# Patient Record
Sex: MALE | Race: BLACK OR AFRICAN AMERICAN | Employment: UNEMPLOYED
[De-identification: names, ages, dates, MRNs, and addresses within clinical notes are randomized per-mention and may not be internally consistent; named-entity substitution may affect disease eponyms.]

---

## 2024-06-08 ENCOUNTER — HOSPITAL ENCOUNTER (EMERGENCY)
Facility: HOSPITAL | Age: 19
Discharge: HOME | End: 2024-06-08
Attending: EMERGENCY MEDICINE
Payer: MEDICAID

## 2024-06-08 VITALS
BODY MASS INDEX: 30.2 KG/M2 | RESPIRATION RATE: 16 BRPM | WEIGHT: 248 LBS | SYSTOLIC BLOOD PRESSURE: 141 MMHG | TEMPERATURE: 98 F | DIASTOLIC BLOOD PRESSURE: 87 MMHG | HEART RATE: 97 BPM | HEIGHT: 76 IN | OXYGEN SATURATION: 99 %

## 2024-06-08 DIAGNOSIS — F43.23 SITUATIONAL MIXED ANXIETY AND DEPRESSIVE DISORDER: Primary | ICD-10-CM

## 2024-06-08 DIAGNOSIS — R82.90 ABNORMAL FINDING ON URINALYSIS: ICD-10-CM

## 2024-06-08 DIAGNOSIS — R63.0 LOSS OF APPETITE: ICD-10-CM

## 2024-06-08 LAB
AMPHET UR QL SCN: NEGATIVE
ANION GAP SERPL CALC-SCNC: 10 MMOL/L (ref 3–18)
APPEARANCE UR: CLEAR
BACTERIA URNS QL MICRO: ABNORMAL /HPF
BARBITURATES UR QL SCN: NEGATIVE
BASOPHILS # BLD: 0.1 K/UL (ref 0–0.1)
BASOPHILS NFR BLD: 1 % (ref 0–2)
BENZODIAZ UR QL: NEGATIVE
BILIRUB UR QL: ABNORMAL
BUN SERPL-MCNC: 7 MG/DL (ref 7–18)
BUN/CREAT SERPL: 7 (ref 12–20)
CALCIUM SERPL-MCNC: 9.5 MG/DL (ref 8.5–10.1)
CANNABINOIDS UR QL SCN: NEGATIVE
CHLORIDE SERPL-SCNC: 104 MMOL/L (ref 100–111)
CO2 SERPL-SCNC: 25 MMOL/L (ref 21–32)
COCAINE UR QL SCN: NEGATIVE
COLOR UR: ABNORMAL
CREAT SERPL-MCNC: 1.02 MG/DL (ref 0.6–1.3)
DIFFERENTIAL METHOD BLD: ABNORMAL
EOSINOPHIL # BLD: 0.1 K/UL (ref 0–0.4)
EOSINOPHIL NFR BLD: 1 % (ref 0–5)
EPITH CASTS URNS QL MICRO: NEGATIVE /LPF (ref 0–5)
ERYTHROCYTE [DISTWIDTH] IN BLOOD BY AUTOMATED COUNT: 11.8 % (ref 11.6–14.5)
ETHANOL SERPL-MCNC: <3 MG/DL (ref 0–3)
GLUCOSE SERPL-MCNC: 69 MG/DL (ref 74–99)
GLUCOSE UR STRIP.AUTO-MCNC: NEGATIVE MG/DL
HCT VFR BLD AUTO: 44.6 % (ref 36–48)
HGB BLD-MCNC: 14.9 G/DL (ref 13–16)
HGB UR QL STRIP: NEGATIVE
IMM GRANULOCYTES # BLD AUTO: 0 K/UL (ref 0–0.04)
IMM GRANULOCYTES NFR BLD AUTO: 0 % (ref 0–0.5)
KETONES UR QL STRIP.AUTO: 40 MG/DL
LEUKOCYTE ESTERASE UR QL STRIP.AUTO: ABNORMAL
LYMPHOCYTES # BLD: 2.7 K/UL (ref 0.9–3.6)
LYMPHOCYTES NFR BLD: 41 % (ref 21–52)
Lab: NORMAL
MCH RBC QN AUTO: 31.4 PG (ref 24–34)
MCHC RBC AUTO-ENTMCNC: 33.4 G/DL (ref 31–37)
MCV RBC AUTO: 93.9 FL (ref 78–100)
METHADONE UR QL: NEGATIVE
MONOCYTES # BLD: 0.8 K/UL (ref 0.05–1.2)
MONOCYTES NFR BLD: 12 % (ref 3–10)
MUCOUS THREADS URNS QL MICRO: ABNORMAL /LPF
NEUTS SEG # BLD: 3 K/UL (ref 1.8–8)
NEUTS SEG NFR BLD: 45 % (ref 40–73)
NITRITE UR QL STRIP.AUTO: POSITIVE
NRBC # BLD: 0 K/UL (ref 0–0.01)
NRBC BLD-RTO: 0 PER 100 WBC
OPIATES UR QL: NEGATIVE
PCP UR QL: NEGATIVE
PH UR STRIP: 6 (ref 5–8)
PLATELET # BLD AUTO: 362 K/UL (ref 135–420)
PMV BLD AUTO: 11.8 FL (ref 9.2–11.8)
POTASSIUM SERPL-SCNC: 4.2 MMOL/L (ref 3.5–5.5)
PROT UR STRIP-MCNC: 100 MG/DL
RBC # BLD AUTO: 4.75 M/UL (ref 4.35–5.65)
RBC #/AREA URNS HPF: NEGATIVE /HPF (ref 0–5)
SODIUM SERPL-SCNC: 139 MMOL/L (ref 136–145)
SP GR UR REFRACTOMETRY: >1.03 (ref 1–1.03)
UROBILINOGEN UR QL STRIP.AUTO: 1 EU/DL (ref 0.2–1)
WBC # BLD AUTO: 6.5 K/UL (ref 4.6–13.2)
WBC URNS QL MICRO: ABNORMAL /HPF (ref 0–4)

## 2024-06-08 PROCEDURE — 80307 DRUG TEST PRSMV CHEM ANLYZR: CPT

## 2024-06-08 PROCEDURE — 87086 URINE CULTURE/COLONY COUNT: CPT

## 2024-06-08 PROCEDURE — 85025 COMPLETE CBC W/AUTO DIFF WBC: CPT

## 2024-06-08 PROCEDURE — 81001 URINALYSIS AUTO W/SCOPE: CPT

## 2024-06-08 PROCEDURE — 80048 BASIC METABOLIC PNL TOTAL CA: CPT

## 2024-06-08 PROCEDURE — 90791 PSYCH DIAGNOSTIC EVALUATION: CPT | Performed by: SOCIAL WORKER

## 2024-06-08 PROCEDURE — 99283 EMERGENCY DEPT VISIT LOW MDM: CPT

## 2024-06-08 PROCEDURE — 82077 ASSAY SPEC XCP UR&BREATH IA: CPT

## 2024-06-08 ASSESSMENT — PAIN - FUNCTIONAL ASSESSMENT
PAIN_FUNCTIONAL_ASSESSMENT: NONE - DENIES PAIN

## 2024-06-08 ASSESSMENT — LIFESTYLE VARIABLES
HOW MANY STANDARD DRINKS CONTAINING ALCOHOL DO YOU HAVE ON A TYPICAL DAY: PATIENT DOES NOT DRINK
HOW OFTEN DO YOU HAVE A DRINK CONTAINING ALCOHOL: NEVER

## 2024-06-08 NOTE — ED TRIAGE NOTES
Pt arrived via Triage ambulatory c/o depression. Mom has given him to get out of the house by July and pt has been anxious about it and not wanting to eat.

## 2024-06-08 NOTE — ED NOTES
Patient states he is having \"a lot of stress\". States he isn't feeling like himself, had an anxiety attack and couldn't calm down for like an hour. Patient also states that he has been having nightmares. He was told that his mother states he has to get out of her house by the end of June and he doesn't have any family around here and he is a full time student.

## 2024-06-08 NOTE — ED PROVIDER NOTES
Dr. Armenta taking for patient care.  Patient signed out to me pending crisis evaluation for resources.  Patient requesting to be discharged at this time.  Believe this to be appropriate.  Denies any SI, HI, or hallucinations.  Will discharge patient with strict return precautions and follow-up recommendations.  Patient verbalized understanding and is without further questions.     Bruce Armenta Jr., DO  06/08/24 0729

## 2024-06-08 NOTE — DISCHARGE INSTRUCTIONS
[email or web address]/    [facility]  [address]  [place] [address]  [phone]  Fax:  Website: [email or web address]    [facility]  [address]  [place], [place]  [phone]  Fax:  Website: [email or web address]://[email or web address]."[email or web address].[email or web address]/    [facility]  [address]  [place] [address]  [phone]  Fax: NONE  Website: [email or web address].[email or web address] [facility]/[email or web address]    [facility]  [address]  [address]  [place] [address]  [phone]  Fax: [phone]  Website: [email or web address]    [facility]  [address] [place] [address]  [phone]  Fax: [phone]  Website: [email or web address]    [facility]  [address]  [place] [address]  [phone]  Fax:  Website: [email or web address]    [facility]  [address]  [place] [address]  [phone]  Website: [email or web address]    [facility]  [address]  [place] [address]  [phone]  Fax: [phone]  Website: [email or web address]    [facility]  [address] [address]  [phone]  Fax: [phone]  Website: [email or web address]               [facility]  [facility] [place] [address]  Phone: [phone]  Fax: [phone]    [facility] offers two comprehensive outpatient programs to assist individuals struggling with mental illness, substance abuse, and dual diagnosis (mental illness and substance abuse).  In-person and virtual sessions are now offered for the Partial Hospitalization Program (PHP).   Intensive Outpatient  and Thursday, for 3 hours per session   Group sessions are from 4:30 pm - 7:30 pm   Program length is approximately 6 weeks    Our intensive programs allow patients to receive treatment while remaining connected to friends, family, and other support systems. Both programs are primarily of group therapy session and we integrate individual therapy throughout the course of treatment.    Our caring, knowledgeable, and skilled team members include:   Board Certified Psychiatrist   Licensed and licensed-eligible behavioral health therapists   Recreation therapist   Art therapist    [facility] accepts most insurance, including Medicare and Medicaid. To learn more about PHP and/or IOP, or schedule an appointment please call [phone] (M-F 9:00 am - 5:00 pm).  We look forward to helping you on your path to wellness.       NATIONAL NUMBERS     Samaritans 5-179-887-HOPE ([phone])     Samariteens 9-281-607-TEEN ([phone])     National Suicide Prevention Lifeline 2-232-057-TALK (8255)        LOCAL NUMBERS     [facility] Emergency Services:  [phone]     [facility] Emergency Services:  [phone]     [place] Crisis Hotline:  969-3588     [place] Emergency Services Hotline:  043-3531     [facility] Emergency Services:  [phone]     [facility] Services Board:  [phone]

## 2024-06-08 NOTE — VIRTUAL HEALTH
Dima Nichols  [id number]  2005     Social Work Behavioral Health Crisis Assessment    06/08/24    Chief Complaint: Social Stressors, Major Depressive Disorder    HPI: Patient is a 18 y.o. Black /  male who presents for depression. Patient presented to the ED on 06/08/24 from home.    Past Psychiatric History:  Previous Diagnoses/symptoms: Major Depressive Disorder  Previous suicide attempts/self-harm: Denies  Inpatient psychiatric hospitalizations: denies  Current outpatient psychiatric provider: Denies  Current therapist: States not in therapy  Previous psychiatric medication trials: No prior medication trials  Current psychiatric medications: No current psychiatric medications  Family Psychiatric History: Denies    Sleep Hours: 8    Sleep concerns: denies    Use of sleep medications: denies    Substance Abuse History:  Tobacco: Denies  Alcohol: Denies  Marijuana: Denies  Stimulant: Denies  Opiates: Denies  Benzodiazepine: Denies  Other illicit drug usage: Denies  History of substance/alcohol abuse treatment: Denies    Social History:  Education: H.S.  Living Situation/Interest: with family  Marital/Committed relationship and parenting hx: single  Occupation: Unemployed  Legal History/Hx of Violence: Denies  Spiritual History: Denies  Psychological trauma, neglect, or abuse: denies hx of trauma/abuse   Access to guns or other weapons: denies having access to firearms/dangerous weapons     Past Medical History:  Active Ambulatory Problems     Diagnosis Date Noted    No Active Ambulatory Problems     Resolved Ambulatory Problems     Diagnosis Date Noted    No Resolved Ambulatory Problems     No Additional Past Medical History     Allergies:  No Known Allergies   Medications:  No current facility-administered medications for this encounter.    Current Outpatient Medications:     amphetamine-dextroamphetamine (ADDERALL) 30 MG tablet, Take 1 tablet by mouth daily., Disp: , Rfl:     cloNIDine  (CATAPRES) 0.1 MG tablet, Take 1 tablet by mouth nightly at bedtime, Disp: , Rfl:     benzocaine-menthol (CEPACOL SORE THROAT) 15-3.6 MG lozenge, Take 1 lozenge by mouth 4 times daily, Disp: , Rfl:   Not in a hospital admission.  Prior to Admission medications    Medication Sig Start Date End Date Taking? Authorizing Provider   amphetamine-dextroamphetamine (ADDERALL) 30 MG tablet Take 1 tablet by mouth daily. 1/28/24   Teofilo Muñoz MD   cloNIDine (CATAPRES) 0.1 MG tablet Take 1 tablet by mouth nightly at bedtime 1/28/24   Teofilo Muñoz MD   benzocaine-menthol (CEPACOL SORE THROAT) 15-3.6 MG lozenge Take 1 lozenge by mouth 4 times daily 8/5/22   Teofilo Muñoz MD        Labs:  UDS: not available  ETOH: not available  HCG: Unknown      Mental Status Exam:  Level of consciousness:  within normal limits   Appearance:  well-appearing.  Does appear stated age. No acute distress.  Behavior/Motor:  no abnormalities noted  Attitude toward examiner:  cooperative  SI/HI:Denies SI/HI  Speech:  normal rate , Tone: normal tone  Mood: within normal limits  Affect: mood congruent  Thought Processes:  linear.   Thought Content: No delusions or other perceptual abnormalities  Hallucinations:  Hallucinations: Denies AVOT-H  Cognition:  oriented to person, place, and time   Concentration: intact  Memory: intact, though not formally tested.  Insight: good   Judgement: good   Fund of Knowledge: good    Overall Level Suicide Risk: TelePsych CSSRS Risk Level: Low Risk  CSSR-S Screening: Completed    Brief ClinicalSummary:   Patient is a 18 y.o.  male who presents for social stressors and depression. The patient lives with his mother, who is a . She has given the patient until the end of June to find his own place to live. The patient is dealing with the stress of finding a job and a new place, which is triggering depressive symptoms. The patient came to the hospital tonight because he

## 2024-06-08 NOTE — ED NOTES
Assumed care of pt after receiving bedside shift report from Aria DUVAL.     Pt here for depression and anxiety, awaiting BSMART consult. Pt currently up in room, awake and alert. NAD. Pt denies SI/HI.

## 2024-06-08 NOTE — ED PROVIDER NOTES
EMERGENCY DEPARTMENT HISTORY AND PHYSICAL EXAM    Date: 6/8/2024  Patient Name: Dima Nichols    History of Presenting Illness     Chief Complaint:   Chief Complaint   Patient presents with    Depression     History Provided By: Patient    Additional History (Context): Dima Nichols is a 18 y.o. male with history of ADHD presents for evaluation of depression and loss of appetite.  States, he had a few panic attacks at home and now his mother gave him eviction notice and he must move out by July.  Patient reports increased anxiety about the slightly, reports 40 pound weight loss due to anorexia secondary to anxiety and depression.  Denies pains but notes occasional generalized discomfort in abdomen due to lack of fluid intake per patient.  He also notes that he recently dropped out of college as he could not change his major in the middle of the semester.  He is currently unemployed.  Patient takes no medications at the moment.  He used to take Adderall and clonidine in the past.  Denies drug or alcohol use. Denies SI, HI, hallucinations.    PCP: Gena Aguilar, [place] 6/19/24 (per records)    No current facility-administered medications for this encounter.     Current Outpatient Medications   Medication Sig Dispense Refill    amphetamine-dextroamphetamine (ADDERALL) 30 MG tablet Take 1 tablet by mouth daily.      cloNIDine (CATAPRES) 0.1 MG tablet Take 1 tablet by mouth nightly at bedtime      benzocaine-menthol (CEPACOL SORE THROAT) 15-3.6 MG lozenge Take 1 lozenge by mouth 4 times daily         Past History     Past Medical History:  No past medical history on file.    Past Surgical History:  No past surgical history on file.    Family History:  No family history on file.    Social History:       Allergies:  No Known Allergies      Review of Systems   Review of Systems  All Other Systems Negative  10 point review of systems otherwise negative unless noted in HPI.     Physical Exam     Vitals:    06/08/24 0021 06/08/24  thoroughly.   Bedside rounding on patient occured : no  Intended disposition of patient : home  Pending diagnostics reports and/or labs (please list): BSMART consult, can be dc home once BSMART approves.     UA noted noticed to have nitrites, white BC on microscopic UA is 0-3, patient denies any urinary symptoms whatsoever, no STD concerns.  Unsure if UA results are accurate.  Urine culture ordered and results will be addressed, will not start on ABX just yet.    MED RECONCILIATION:  No current facility-administered medications for this encounter.     Current Outpatient Medications   Medication Sig    amphetamine-dextroamphetamine (ADDERALL) 30 MG tablet Take 1 tablet by mouth daily.    cloNIDine (CATAPRES) 0.1 MG tablet Take 1 tablet by mouth nightly at bedtime    benzocaine-menthol (CEPACOL SORE THROAT) 15-3.6 MG lozenge Take 1 lozenge by mouth 4 times daily       Disposition:  home (?),  Awaiting BSMART consult          Medication List        ASK your doctor about these medications      amphetamine-dextroamphetamine 30 MG tablet  Commonly known as: ADDERALL     benzocaine-menthol 15-3.6 MG lozenge  Commonly known as: CEPACOL SORE THROAT     cloNIDine 0.1 MG tablet  Commonly known as: CATAPRES                  Diagnosis     Clinical Impression:   1. Situational mixed anxiety and depressive disorder    2. Loss of appetite    3. Abnormal finding on urinalysis - no urinary symptoms or penile discharge          Dictation disclaimer:  Please note that this dictation was completed with [employer], the computer voice recognition software.  Quite often unanticipated grammatical, syntax, homophones, and other interpretive errors are inadvertently transcribed by the computer software.  Please disregard these errors.  Please excuse any errors that have escaped final proofreading.        Bill Serra PA  06/08/24 0304       Bill Serra PA  06/08/24 0307

## 2024-06-08 NOTE — FLOWSHEET NOTE
06/08/24 0746   Departure Condition   Departure Condition Stable   Mobility at Departure Ambulatory   Patient Teaching Discharge instructions reviewed;Follow-up care reviewed;Patient verbalized understanding   Discharged with Documented Belongings Yes   Valuables Given To Patient   Departure Mode With parents   Discharged with LDA No   Vital Signs   Temp 98 °F (36.7 °C)   Temp Source Oral   Pulse 97   Respirations 16   BP (!) 141/87   MAP (Calculated) 105   BP Location Left upper arm   Pain Assessment   Pain Assessment None - Denies Pain   Oxygen Therapy   SpO2 99 %

## 2024-06-09 LAB
BACTERIA SPEC CULT: NORMAL
SERVICE CMNT-IMP: NORMAL

## 2024-08-02 ENCOUNTER — HOSPITAL ENCOUNTER (EMERGENCY)
Facility: HOSPITAL | Age: 19
Discharge: HOME | End: 2024-08-02
Attending: EMERGENCY MEDICINE
Payer: MEDICAID

## 2024-08-02 VITALS
SYSTOLIC BLOOD PRESSURE: 144 MMHG | TEMPERATURE: 98.6 F | HEART RATE: 83 BPM | OXYGEN SATURATION: 96 % | HEIGHT: 76 IN | RESPIRATION RATE: 14 BRPM | BODY MASS INDEX: 26.79 KG/M2 | WEIGHT: 220 LBS | DIASTOLIC BLOOD PRESSURE: 83 MMHG

## 2024-08-02 DIAGNOSIS — F32.A DEPRESSION, UNSPECIFIED DEPRESSION TYPE: Primary | ICD-10-CM

## 2024-08-02 LAB
ALBUMIN SERPL-MCNC: 4.2 G/DL (ref 3.4–5)
ALBUMIN/GLOB SERPL: 1.2 (ref 0.8–1.7)
ALP SERPL-CCNC: 51 U/L (ref 45–117)
ALT SERPL-CCNC: 25 U/L (ref 16–61)
AMPHET UR QL SCN: NEGATIVE
ANION GAP SERPL CALC-SCNC: 7 MMOL/L (ref 3–18)
APAP SERPL-MCNC: <2 UG/ML (ref 10–30)
AST SERPL-CCNC: 21 U/L (ref 10–38)
BARBITURATES UR QL SCN: NEGATIVE
BASOPHILS # BLD: 0.1 K/UL (ref 0–0.1)
BASOPHILS NFR BLD: 2 % (ref 0–2)
BENZODIAZ UR QL: NEGATIVE
BILIRUB SERPL-MCNC: 1 MG/DL (ref 0.2–1)
BUN SERPL-MCNC: 6 MG/DL (ref 7–18)
BUN/CREAT SERPL: 7 (ref 12–20)
CALCIUM SERPL-MCNC: 9.8 MG/DL (ref 8.5–10.1)
CANNABINOIDS UR QL SCN: NEGATIVE
CHLORIDE SERPL-SCNC: 106 MMOL/L (ref 100–111)
CO2 SERPL-SCNC: 27 MMOL/L (ref 21–32)
COCAINE UR QL SCN: NEGATIVE
CREAT SERPL-MCNC: 0.88 MG/DL (ref 0.6–1.3)
DIFFERENTIAL METHOD BLD: ABNORMAL
EOSINOPHIL # BLD: 0 K/UL (ref 0–0.4)
EOSINOPHIL NFR BLD: 1 % (ref 0–5)
ERYTHROCYTE [DISTWIDTH] IN BLOOD BY AUTOMATED COUNT: 12.2 % (ref 11.6–14.5)
ETHANOL SERPL-MCNC: <3 MG/DL (ref 0–3)
GLOBULIN SER CALC-MCNC: 3.4 G/DL (ref 2–4)
GLUCOSE SERPL-MCNC: 79 MG/DL (ref 74–99)
HCT VFR BLD AUTO: 44.4 % (ref 36–48)
HGB BLD-MCNC: 14.8 G/DL (ref 13–16)
IMM GRANULOCYTES # BLD AUTO: 0 K/UL (ref 0–0.04)
IMM GRANULOCYTES NFR BLD AUTO: 0 % (ref 0–0.5)
LYMPHOCYTES # BLD: 1.6 K/UL (ref 0.9–3.6)
LYMPHOCYTES NFR BLD: 37 % (ref 21–52)
Lab: NORMAL
MCH RBC QN AUTO: 31.5 PG (ref 24–34)
MCHC RBC AUTO-ENTMCNC: 33.3 G/DL (ref 31–37)
MCV RBC AUTO: 94.5 FL (ref 78–100)
METHADONE UR QL: NEGATIVE
MONOCYTES # BLD: 0.6 K/UL (ref 0.05–1.2)
MONOCYTES NFR BLD: 13 % (ref 3–10)
NEUTS SEG # BLD: 2.1 K/UL (ref 1.8–8)
NEUTS SEG NFR BLD: 47 % (ref 40–73)
NRBC # BLD: 0 K/UL (ref 0–0.01)
NRBC BLD-RTO: 0 PER 100 WBC
OPIATES UR QL: NEGATIVE
PCP UR QL: NEGATIVE
PLATELET # BLD AUTO: ABNORMAL K/UL (ref 135–420)
PMV BLD AUTO: 12 FL (ref 9.2–11.8)
POTASSIUM SERPL-SCNC: 4 MMOL/L (ref 3.5–5.5)
PROT SERPL-MCNC: 7.6 G/DL (ref 6.4–8.2)
RBC # BLD AUTO: 4.7 M/UL (ref 4.35–5.65)
SALICYLATES SERPL-MCNC: <1.7 MG/DL (ref 2.8–20)
SODIUM SERPL-SCNC: 140 MMOL/L (ref 136–145)
WBC # BLD AUTO: 4.4 K/UL (ref 4.6–13.2)

## 2024-08-02 PROCEDURE — 80307 DRUG TEST PRSMV CHEM ANLYZR: CPT

## 2024-08-02 PROCEDURE — 82077 ASSAY SPEC XCP UR&BREATH IA: CPT

## 2024-08-02 PROCEDURE — 99285 EMERGENCY DEPT VISIT HI MDM: CPT

## 2024-08-02 PROCEDURE — 80053 COMPREHEN METABOLIC PANEL: CPT

## 2024-08-02 PROCEDURE — 80179 DRUG ASSAY SALICYLATE: CPT

## 2024-08-02 PROCEDURE — 85025 COMPLETE CBC W/AUTO DIFF WBC: CPT

## 2024-08-02 PROCEDURE — 80143 DRUG ASSAY ACETAMINOPHEN: CPT

## 2024-08-02 ASSESSMENT — PAIN - FUNCTIONAL ASSESSMENT: PAIN_FUNCTIONAL_ASSESSMENT: NONE - DENIES PAIN

## 2024-08-02 NOTE — DISCHARGE INSTRUCTIONS
Call 911 immediately for any thoughts of harming yourself or other or hallucinations.   Return to the ER for any new, worsening, or persistent symptoms including thoughts of harming self or others, confusion, hallucinations, or any concerns.  Follow-up with the provider.     PPD skin test reading was negative 0mm.  Jose Miguel RN BSN

## 2024-08-02 NOTE — ED TRIAGE NOTES
Patient arrived via triage for c/o mental health problem. Patient states that he has been feeling depressed and some anxiety from traumatic events recently.
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
## 2024-08-02 NOTE — ED PROVIDER NOTES
EMERGENCY DEPARTMENT HISTORY AND PHYSICAL EXAM    1:18 PM      Date: 8/2/2024  Patient Name: Dima Nichols    History of Presenting Illness     Chief Complaint   Patient presents with    Mental Health Problem       History From: Patient  HPI  Dima Nichols is a 18 y.o. who male  has no past medical history on file.. Dima Nichols is a 18 y.o. male presenting with depression and intermittent suicidal thoughts.  He states he does not feel like he would act on his suicidal thoughts but notes that he is not in a good place mentally.  He states he is here for similar symptoms and was not suicidal so was discharged home and has been trying to use hotlines to arrange for a therapist referral but has not been successful in doing so.  He states that he has been trying to do the right thing and to try to join the  but they said he had to be off his antidepressants for 6 months so he has stopped them he denies homicidal ideations or hallucinations.       Nursing Notes were all reviewed and agreed with or any disagreements were addressed in the HPI.    PCP: No primary care provider on file.    No current facility-administered medications for this encounter.     Current Outpatient Medications   Medication Sig Dispense Refill    amphetamine-dextroamphetamine (ADDERALL) 30 MG tablet Take 1 tablet by mouth daily.      cloNIDine (CATAPRES) 0.1 MG tablet Take 1 tablet by mouth nightly at bedtime      benzocaine-menthol (CEPACOL SORE THROAT) 15-3.6 MG lozenge Take 1 lozenge by mouth 4 times daily         Past History     Past Medical History:  History reviewed. No pertinent past medical history.    Past Surgical History:  History reviewed. No pertinent surgical history.    Family History:  History reviewed. No pertinent family history.    Social History:       Allergies:  No Known Allergies          Focused Physical Exam   BP (!) 144/83   Pulse 83   Temp 98.6 °F (37 °C) (Oral)   Resp 14   Ht 1.93 m (6' 4\")   Wt 99.8 kg (220

## 2024-08-02 NOTE — VIRTUAL HEALTH
[place] Consult to Tele-Psych  Consult performed by: Minerva Ryder, APRN - CNP  Consult ordered by: Emma Salas DO  Reason for consult: Other: Per bsmart patient has a therapist that he has not been to in 2 years, not really receptive to inpatient treatment.      Reason for Cancel: Other    This writer discussed case with ED provider, Dr. Salas, who reports will defer to on-call psychiatrist recommendation from Dr. Parr and plan for discharge. Per Dr. Salas, will consult CM and BSMART for discharge planning, MH resources, and assistance obtaining outpatient MH appointments with patient's therapist.     Per BSMART note 08/02: “Crisis spoke with the patient about telepsych recommending inpatient psychiatric admission, as he stated, \"I do have thoughts; however, I do not have the mental capacity to actually go through with it. I also have a therapist, but I haven't seen him in 2 years.\" Crisis suggested doing a reassessment since patient informed that he does have therapist; however, if telepsych, continues to recommends inpatient treatment and patient is still not receptive to treatment a TDO petition will be conducted and crisis informed him on the steps of a TDO. In consultation with on call provider Keshav MCDANIEL, regarding inpatient psychiatric admission and informed that the patient could be discharged home with outpatient resources.”

## 2024-08-02 NOTE — BSMART NOTE
Chief Complaint   Patient presents with    Mental Health Problem       Patient was evaluated by Tele-Psych who recommends inpatient psychiatric treatment for suicidal ideations without a plan     Patient is not voluntary for inpatient treatment.    History reviewed. No pertinent past medical history.    Prior to Visit Medications    Medication Sig Taking? Authorizing Provider   amphetamine-dextroamphetamine (ADDERALL) 30 MG tablet Take 1 tablet by mouth daily.  Teofilo Muñoz MD   cloNIDine (CATAPRES) 0.1 MG tablet Take 1 tablet by mouth nightly at bedtime  ProviderTeofilo MD   benzocaine-menthol (CEPACOL SORE THROAT) 15-3.6 MG lozenge Take 1 lozenge by mouth 4 times daily  ProviderTeofilo MD         The following labs and vitals were reviewed with on-call psychiatrist.    BMP, CMP, and CBC    Vitals:    08/02/24 0813   BP: (!) 144/83   Pulse: 83   Resp: 14   Temp: 98.6 °F (37 °C)   SpO2: 96%         Writer met with patient to assess the following    Ambulation - Patient reports ability to ambulate without difficulty or the use of any assistive devices. and Patient denies any recent falls in the past three months .     ADLs - Patient able to perform own ADLs without assistance.    DME - Patient requires none.    Crisis spoke with the patient about telepsych recommending inpatient psychiatric admission, as he stated, \"I do have thoughts; however, I do not have the mental capacity to actually go through with it. I also have a therapist, but I haven't seen him in 2 years.\" Crisis suggested doing a reassessment since patient informed that he does have therapist; however, if telepsych, continues to recommends inpatient treatment and patient is still not receptive to treatment a TDO petition will be conducted and crisis informed him on the steps of a TDO.     In consultation with on call provider Keshav MCDANIEL, regarding inpatient psychiatric admission and informed that the patient could be discharged

## 2024-09-23 ENCOUNTER — APPOINTMENT (OUTPATIENT)
Facility: HOSPITAL | Age: 19
End: 2024-09-23
Payer: MEDICAID

## 2024-09-23 ENCOUNTER — TELEPHONE (OUTPATIENT)
Facility: HOSPITAL | Age: 19
End: 2024-09-23

## 2024-09-26 ENCOUNTER — HOSPITAL ENCOUNTER (OUTPATIENT)
Facility: HOSPITAL | Age: 19
Setting detail: RECURRING SERIES
Discharge: HOME | End: 2024-09-29
Payer: MEDICAID

## 2024-09-26 PROCEDURE — 97162 PT EVAL MOD COMPLEX 30 MIN: CPT

## 2024-09-26 NOTE — THERAPY EVALUATION
[facility]  [address], [place] [address]  Ph:[phone] Fx:[phone]  Plan of Care / Statement of Necessity for Physical Therapy Services     Patient Name: Dima Nichols : 2005   Medical   Diagnosis: Pain in left hip [M25.552]  Pain in right hip [M25.551]  Pain in right hand [M79.641]  Pain in left hand [M79.642]  Pain in left shoulder [M25.512]  Pain in right shoulder [M25.511]  Treatment Diagnosis:  M25.551  RIGHT HIP PAIN and M25.552  LEFT HIP PAIN  and M25.511  RIGHT SHOULDER PAIN, M25.512  LEFT SHOULDER PAIN, M79.641  Pain in right hand, and M79.642  Pain in left hand   Onset Date: 24     Referral Source: Shannan Olmstead RN Start of Care (SOC): 2024   Prior Hospitalization: See medical history Provider #: [id number]   Prior Level of Function: Independent with ADLS , walking and community activities  with no pain    Comorbidities:  Psychological disorders , ADD, Depression, Autism,Anxiety     Assessment / key information:  Dima Nichols is a 18 y.o.   male with Dx:  Bilateral Hip joint pain, pain in B shoulders and pain in B hands who reports  B  hip pain/ weakness/ tightness  , pain in B groin area, B shoulder pain / weakness/ tightness, pain in B scapular areas, B hand pain/ weakness .  Pt rates pain as 7/10 max, 3/10 at best, 5/10 today, increasing with   ADLS , walking and community activities .  Pt reports he has cramps, pain and tingling in B hands . Pt  also reports that he has weak  of both hands.Objective:Lower Extremity Functional Scale (LEFS) = 60 ; (40-60 Mild to Moderate Dysfunction) = MODERATE Complexity and QuickDASH: Disability Arm, Shoulder, Hand = 9.09 % ; (0% - 40% Normal to Mild Disability) = LOW ComplexityAROM:  L hip Flexion 102 degree ( Pain is limiting movement )  , AROM  R hip Flexion 105 degree ( Pain is limiting movement ) .  B Hip pain increases with all movements of B hip pain.  MMT: B

## 2024-09-26 NOTE — PROGRESS NOTES
PHYSICAL / OCCUPATIONAL THERAPY - DAILY TREATMENT NOTE (updated )  For Eval visit    Patient Name: Dima Nichols    Date: 2024    : 2005  Insurance: Payor: [facility] MEDICAID / Plan: [facility] CARDINAL CARE / Product Type: *No Product type* /      Patient  verified yes     Visit #   Current / Total 1 16   Time   In / Out 932 [id number]   Pain   In / Out 5 5   Subjective Functional Status/Changes: See POC     TREATMENT AREA =  see POC    OBJECTIVE          43 min   Eval - untimed                      Therapeutic Procedures:    Tx Min Billable or 1:1 Min (if diff from Tx Min) Procedure, Rationale, Specifics          Details if applicable:              Details if applicable:            Details if applicable:            Details if applicable:       [facility] Totals Reminder: bill using total billable min of TIMED therapeutic procedures (example: do not include dry needle or estim unattended, both untimed codes, in totals to left)  8-22 min = 1 unit; 23-37 min = 2 units; 38-52 min = 3 units; 53-67 min = 4 units; 68-82 min = 5 units   Total Total     [x]  Patient Education billed concurrently with other procedures   [x] Review HEP    [] Progressed/Changed HEP, detail:    [] Other detail:       Objective Information/Functional Measures/Assessment    See POC    Patient will continue to benefit from skilled PT / OT services to modify and progress therapeutic interventions, analyze and address functional mobility deficits, analyze and address ROM deficits, analyze and address strength deficits, analyze and address soft tissue restrictions, analyze and cue for proper movement patterns, and analyze and modify for postural abnormalities to address functional deficits and attain remaining goals.    Progress toward goals / Updated goals:  [x]  See POC        PLAN  yes Continue plan of care  []  Other:      Dali Wills, PT    2024    4:44 PM  If an interpreting service was utilized for treatment of this

## 2024-10-02 ENCOUNTER — TELEPHONE (OUTPATIENT)
Facility: HOSPITAL | Age: 19
End: 2024-10-02

## 2024-10-02 NOTE — TELEPHONE ENCOUNTER
ERICK on 10/2/24 at 2:51pm to schedule f/u visits due to his auth being approved for 16 visits expiring on 11/22/24. 1-2 times a week.

## 2024-10-14 ENCOUNTER — HOSPITAL ENCOUNTER (OUTPATIENT)
Facility: HOSPITAL | Age: 19
Setting detail: RECURRING SERIES
Discharge: HOME | End: 2024-10-17
Payer: MEDICAID

## 2024-10-14 PROCEDURE — 97112 NEUROMUSCULAR REEDUCATION: CPT

## 2024-10-14 PROCEDURE — 97530 THERAPEUTIC ACTIVITIES: CPT

## 2024-10-14 PROCEDURE — 97535 SELF CARE MNGMENT TRAINING: CPT

## 2024-10-14 PROCEDURE — 97110 THERAPEUTIC EXERCISES: CPT

## 2024-10-14 NOTE — PROGRESS NOTES
PHYSICAL / OCCUPATIONAL THERAPY - DAILY TREATMENT NOTE    Patient Name: Dima Nichols    Date: 10/14/2024    : 2005  Insurance: Payor: [facility] MEDICAID / Plan: [facility] PLAN CARDINAL CARE / Product Type: *No Product type* /      Patient  verified Yes     Visit #   Current / Total 2 16   Time   In / Out 9:30 10:10   Pain   In / Out 3 0   Subjective Functional Status/Changes: Pt reports that he has been doing his exercises at home and having less pain.      TREATMENT AREA =  Pain in left hip [M25.552]  Pain in right hip [M25.551]  Pain in right hand [M79.641]  Pain in left hand [M79.642]  Pain in left shoulder [M25.512]  Pain in right shoulder [M25.511]     OBJECTIVE    Therapeutic Procedures:    Tx Min Billable or 1:1 Min (if diff from Tx Min) Procedure, Rationale, Specifics   14  36183 Therapeutic Activity (timed):  use of dynamic activities replicating functional movements to increase ROM, strength, coordination, balance, and proprioception in order to improve patient's ability to progress to PLOF and address remaining functional goals.  (see flow sheet as applicable)     Details if applicable:       10  14142 Therapeutic Exercise (timed):  increase ROM, strength, coordination, balance, and proprioception to improve patient's ability to progress to PLOF and address remaining functional goals. (see flow sheet as applicable)     Details if applicable:     8  36383 Neuromuscular Re-Education (timed):  improve balance, coordination, kinesthetic sense, posture, core stability and proprioception to improve patient's ability to develop conscious control of individual muscles and awareness of position of extremities in order to progress to PLOF and address remaining functional goals. (see flow sheet as applicable)     Details if applicable:     8  41921 Self Care/Home Management (timed):  improve patient knowledge and understanding of pain reducing techniques, activity modification, diagnosis/prognosis,

## 2024-10-18 ENCOUNTER — HOSPITAL ENCOUNTER (OUTPATIENT)
Facility: HOSPITAL | Age: 19
Setting detail: RECURRING SERIES
Discharge: HOME | End: 2024-10-21
Payer: MEDICAID

## 2024-10-18 PROCEDURE — 97110 THERAPEUTIC EXERCISES: CPT

## 2024-10-18 PROCEDURE — 97112 NEUROMUSCULAR REEDUCATION: CPT

## 2024-10-18 PROCEDURE — 97535 SELF CARE MNGMENT TRAINING: CPT

## 2024-10-18 PROCEDURE — 97530 THERAPEUTIC ACTIVITIES: CPT

## 2024-10-18 NOTE — PROGRESS NOTES
PHYSICAL / OCCUPATIONAL THERAPY - DAILY TREATMENT NOTE    Patient Name: Dima Nichols    Date: 10/18/2024    : 2005  Insurance: Payor: [facility] MEDICAID / Plan: [facility] PLAN CARDINAL CARE / Product Type: *No Product type* /      Patient  verified Yes     Visit #   Current / Total 3 16   Time   In / Out 9:34 10:19   Pain   In / Out 0 0   Subjective Functional Status/Changes: \"I had a little pain on Monday.\"     TREATMENT AREA =  Pain in left hip [M25.552]  Pain in right hip [M25.551]  Pain in right hand [M79.641]  Pain in left hand [M79.642]  Pain in left shoulder [M25.512]  Pain in right shoulder [M25.511]     OBJECTIVE         Therapeutic Procedures:    Tx Min Billable or 1:1 Min (if diff from Tx Min) Procedure, Rationale, Specifics   15  80799 Therapeutic Exercise (timed):  increase ROM, strength, coordination, balance, and proprioception to improve patient's ability to progress to PLOF and address remaining functional goals. (see flow sheet as applicable)     Details if applicable:  increased  rep  per flow sheet     12  43101 Therapeutic Activity (timed):  use of dynamic activities replicating functional movements to increase ROM, strength, coordination, balance, and proprioception in order to improve patient's ability to progress to PLOF and address remaining functional goals.  (see flow sheet as applicable)     Details if applicable:     10  79937 Neuromuscular Re-Education (timed):  improve balance, coordination, kinesthetic sense, posture, core stability and proprioception to improve patient's ability to develop conscious control of individual muscles and awareness of position of extremities in order to progress to PLOF and address remaining functional goals. (see flow sheet as applicable)     Details if applicable:     8  56328 Self Care/Home Management (timed):  improve patient knowledge and understanding of reviewed  HEP  to improve patient's ability to progress to PLOF and address

## 2024-10-21 ENCOUNTER — HOSPITAL ENCOUNTER (OUTPATIENT)
Facility: HOSPITAL | Age: 19
Setting detail: RECURRING SERIES
End: 2024-10-21
Payer: MEDICAID

## 2024-10-21 ENCOUNTER — TELEPHONE (OUTPATIENT)
Facility: HOSPITAL | Age: 19
End: 2024-10-21

## 2024-10-21 NOTE — TELEPHONE ENCOUNTER
Patient cancelled appt. on 10/21/24 at 8:56am due to the patient having another appt. at that time and he is not able to make his appt. today.

## 2024-10-21 NOTE — TELEPHONE ENCOUNTER
Patient cancelled appt. on 10/21/24 at 8:56am due to the patient having another appt. at that time and he is not able to make his appt. today, R/S TO 10/23/24.

## 2024-10-25 ENCOUNTER — APPOINTMENT (OUTPATIENT)
Facility: HOSPITAL | Age: 19
End: 2024-10-25
Payer: MEDICAID

## 2024-10-28 ENCOUNTER — APPOINTMENT (OUTPATIENT)
Facility: HOSPITAL | Age: 19
End: 2024-10-28
Payer: MEDICAID

## 2024-10-29 ENCOUNTER — HOSPITAL ENCOUNTER (OUTPATIENT)
Facility: HOSPITAL | Age: 19
Setting detail: RECURRING SERIES
Discharge: HOME | End: 2024-11-01
Payer: MEDICAID

## 2024-10-29 PROCEDURE — 97110 THERAPEUTIC EXERCISES: CPT

## 2024-10-29 PROCEDURE — 97530 THERAPEUTIC ACTIVITIES: CPT

## 2024-10-29 PROCEDURE — 97112 NEUROMUSCULAR REEDUCATION: CPT

## 2024-10-29 NOTE — THERAPY RECERTIFICATION
[facility]  [address], [address], [place] [address]  Ph:[phone] Fx:[phone]  PHYSICAL THERAPY PROGRESS NOTE  Patient Name: Dima Nichols : 2005   Treatment/Medical Diagnosis: Pain in left hip [M25.552]  Pain in right hip [M25.551]  Pain in right hand [M79.641]  Pain in left hand [M79.642]  Pain in left shoulder [M25.512]  Pain in right shoulder [M25.511]   Referral Source: Shannan Olmstead RN     Date of Initial Visit: 24 Attended Visits: 4 Missed Visits: 2     SUMMARY OF TREATMENT  Patient has been seen for 4 PT sessions and has shown good progress with PT.  Pt reports 75% improvement. Pain level on average is 4/10.  Pt reports less pain and  more mobility of  (B) shoulders,and B hips,able to perform daily chores and job activity. Pt's limitation is lifting heavy objects.      CURRENT STATUS  1. Independent with HEP.  EVAL: HEP not established and given to patient   Current:  1 x day   met     2. Decrease pain assist  with  ADLS  and  community activities   EVAL: 5/10   Current:  4/10   progressing     Long Term Goals: To be accomplished in  6-8  weeks:  1. Decrease pain  to  to assist with  ADLS  and community activities   EVAL: 5/10    Current: 4/10  progressing      2. Improve Lower Extremity Functional Scale (LEFS)  to 80 points in order to show significant functional improvement.  EVAL: Lower Extremity Functional Scale (LEFS)  Score =  60  Current: not assessed      3.Improve QuickDASH: Disability Arm, Shoulder, Hand to 0% in order to show significant functional improvement.  EVAL: QuickDASH: Disability Arm, Shoulder, Hand = 9.09 %    Current :25  not met     3.  To improve B shoulder  strength  ( B shoulder Flex / Abd ) to 5/5 in order to assist with ADLS with decrease Pain   EVAL: 3+/5  Current:   (B) Flex/Abduction  5  met     .4. To improve B knee Flex / Ext /B hip Flex strength to 5/5 in order to assist with ADLS and

## 2024-10-29 NOTE — PROGRESS NOTES
estim unattended, both untimed codes, in totals to left)  8-22 min = 1 unit; 23-37 min = 2 units; 38-52 min = 3 units; 53-67 min = 4 units; 68-82 min = 5 units   Total Total     [x]  Patient Education billed concurrently with other procedures   [x] Review HEP    [] Progressed/Changed HEP, detail:    [] Other detail:       Objective Information/Functional Measures/Assessment  DASH 25  LEFS  complete NV  Functional Gains:  pt reports less pain and  more mobility at right hip, (B) shoulders,and hips, able to perform daily chores and job activity  Functional Deficits: heavy lifting  % improvement: 75%  Pain   Average: 4/10       Best: 2/10     Worst: 4/10  Patient Goal: \"get back to where I was before\"   Mr. Nichols has been seen for 4 treatments and has shown good progress with PT.  Pt reports 75% improvement. Pain level on average is 4/10.  Pt pt reports less pain and  more mobility at right hip, (B) shoulders,and hips, able to perform daily chores and job activity. Pt's limitation is lifting heavy objects.  DASH has increased from  9% to 25% which pt reports he is better. Recommend continuation of PT for pain reduction,further strengthening, improve pt's functional mobility.      Patient will continue to benefit from skilled PT / OT services to modify and progress therapeutic interventions, analyze and address functional mobility deficits, analyze and address ROM deficits, analyze and address strength deficits, analyze and address soft tissue restrictions, analyze and cue for proper movement patterns, analyze and modify for postural abnormalities, analyze and address imbalance/dizziness, and instruct in home and community integration to address functional deficits and attain remaining goals.    Progress toward goals / Updated goals:  []  See Progress Note/Recertification  1. Independent with HEP.  EVAL: HEP not established and given to patient   Current:  1 x day   met     2. Decrease pain assist  with  ADLS  and

## 2025-04-02 ASSESSMENT — ENCOUNTER SYMPTOMS
CONSTIPATION: 0
BLOOD IN STOOL: 0
COUGH: 0
CHEST TIGHTNESS: 0
WHEEZING: 0
RHINORRHEA: 0
VOMITING: 0
SHORTNESS OF BREATH: 0
NAUSEA: 0
BACK PAIN: 0
DIARRHEA: 0
ABDOMINAL PAIN: 0
EYE PAIN: 0
SORE THROAT: 0

## 2025-04-02 NOTE — PROGRESS NOTES
Dima Nichols is a 19 y.o. male presenting today for New Patient (Pt is here to establish care. Pt would like to restart medications. Pt would like to have an EKG. Pt has experienced headaches and sore throat due to seasonal allergies.)  .     Chief Complaint   Patient presents with    New Patient     Pt is here to establish care. Pt would like to restart medications. Pt would like to have an EKG. Pt has experienced headaches and sore throat due to seasonal allergies.       HPI:  Dima Nichols presents to the office today for establishment of care    Patient has a PMH of ADHD, autism, seasonal allergies, depression, ALICJA, obesity    Depression: was previously on paroxetine, not on it any more. Goes to therapy 3x a week.  Patient states he would discuss starting medication when he sees them tomorrow.  He denies any plans to harm himself.  Feels like ever since he has been off his Adderall medication he has been feeling very unmotivated and down.  Patient lives with his mom and reports that she covers over him which she does not like.    ADHD: has been off adderall since June last year.  Not able to get any refills.     ALICJA: had sleep study as a child, was told he had a seizure while sleeping.  Patient has not had any seizures since then.  Reports daytime sleepiness, insomnia and waking up at 3 AM with inability to fall back asleep.     Obesity: Patient weighs 299 pounds reports he eats a lot of processed foods and has not been exercising as much.  He does have weights at home which he can use reports that he will start walking more to lose weight.    Today patient reports headaches and sore throat.  He denies any sick contacts and does not think he has a flu.  Suspects allergies to pollen is causing throat irritation.  He reports left eye vision changes with haziness around objects.  He used to wear glasses in the past and would like to start wearing it again.      Review of Systems   Constitutional:

## 2025-04-03 ENCOUNTER — OFFICE VISIT (OUTPATIENT)
Facility: CLINIC | Age: 20
End: 2025-04-03
Payer: MEDICAID

## 2025-04-03 VITALS
OXYGEN SATURATION: 98 % | DIASTOLIC BLOOD PRESSURE: 75 MMHG | HEIGHT: 76 IN | TEMPERATURE: 98.2 F | BODY MASS INDEX: 36.41 KG/M2 | WEIGHT: 299 LBS | RESPIRATION RATE: 18 BRPM | HEART RATE: 76 BPM | SYSTOLIC BLOOD PRESSURE: 139 MMHG

## 2025-04-03 DIAGNOSIS — Z11.4 SCREENING FOR HIV WITHOUT PRESENCE OF RISK FACTORS: ICD-10-CM

## 2025-04-03 DIAGNOSIS — E66.09 CLASS 2 OBESITY DUE TO EXCESS CALORIES WITHOUT SERIOUS COMORBIDITY WITH BODY MASS INDEX (BMI) OF 36.0 TO 36.9 IN ADULT: Primary | ICD-10-CM

## 2025-04-03 DIAGNOSIS — E66.812 CLASS 2 OBESITY DUE TO EXCESS CALORIES WITHOUT SERIOUS COMORBIDITY WITH BODY MASS INDEX (BMI) OF 36.0 TO 36.9 IN ADULT: ICD-10-CM

## 2025-04-03 DIAGNOSIS — F90.0 ATTENTION DEFICIT HYPERACTIVITY DISORDER (ADHD), PREDOMINANTLY INATTENTIVE TYPE: ICD-10-CM

## 2025-04-03 DIAGNOSIS — G47.33 OSA (OBSTRUCTIVE SLEEP APNEA): ICD-10-CM

## 2025-04-03 DIAGNOSIS — Z11.59 NEED FOR HEPATITIS C SCREENING TEST: ICD-10-CM

## 2025-04-03 DIAGNOSIS — Z76.89 ENCOUNTER TO ESTABLISH CARE WITH NEW DOCTOR: ICD-10-CM

## 2025-04-03 DIAGNOSIS — E66.812 CLASS 2 OBESITY DUE TO EXCESS CALORIES WITHOUT SERIOUS COMORBIDITY WITH BODY MASS INDEX (BMI) OF 36.0 TO 36.9 IN ADULT: Primary | ICD-10-CM

## 2025-04-03 DIAGNOSIS — J30.2 SEASONAL ALLERGIES: ICD-10-CM

## 2025-04-03 DIAGNOSIS — E66.09 CLASS 2 OBESITY DUE TO EXCESS CALORIES WITHOUT SERIOUS COMORBIDITY WITH BODY MASS INDEX (BMI) OF 36.0 TO 36.9 IN ADULT: ICD-10-CM

## 2025-04-03 DIAGNOSIS — Z13.31 POSITIVE DEPRESSION SCREENING: ICD-10-CM

## 2025-04-03 PROCEDURE — G2211 COMPLEX E/M VISIT ADD ON: HCPCS | Performed by: STUDENT IN AN ORGANIZED HEALTH CARE EDUCATION/TRAINING PROGRAM

## 2025-04-03 PROCEDURE — G0447 BEHAVIOR COUNSEL OBESITY 15M: HCPCS | Performed by: STUDENT IN AN ORGANIZED HEALTH CARE EDUCATION/TRAINING PROGRAM

## 2025-04-03 PROCEDURE — 99204 OFFICE O/P NEW MOD 45 MIN: CPT | Performed by: STUDENT IN AN ORGANIZED HEALTH CARE EDUCATION/TRAINING PROGRAM

## 2025-04-03 RX ORDER — CETIRIZINE HYDROCHLORIDE 10 MG/1
10 TABLET ORAL DAILY
Qty: 90 TABLET | Refills: 1 | Status: SHIPPED | OUTPATIENT
Start: 2025-04-03

## 2025-04-03 RX ORDER — FLUTICASONE PROPIONATE 50 MCG
1 SPRAY, SUSPENSION (ML) NASAL DAILY
Qty: 16 G | Refills: 1 | Status: SHIPPED | OUTPATIENT
Start: 2025-04-03

## 2025-04-03 RX ORDER — DEXTROAMPHETAMINE SACCHARATE, AMPHETAMINE ASPARTATE, DEXTROAMPHETAMINE SULFATE AND AMPHETAMINE SULFATE 7.5; 7.5; 7.5; 7.5 MG/1; MG/1; MG/1; MG/1
1 TABLET ORAL DAILY
Qty: 60 TABLET | Refills: 0 | Status: SHIPPED | OUTPATIENT
Start: 2025-04-03 | End: 2025-06-02

## 2025-04-03 RX ORDER — FLUTICASONE PROPIONATE 50 MCG
1 SPRAY, SUSPENSION (ML) NASAL DAILY
Qty: 16 G | Refills: 0 | Status: CANCELLED | OUTPATIENT
Start: 2025-04-03

## 2025-04-03 SDOH — ECONOMIC STABILITY: FOOD INSECURITY: WITHIN THE PAST 12 MONTHS, YOU WORRIED THAT YOUR FOOD WOULD RUN OUT BEFORE YOU GOT MONEY TO BUY MORE.: SOMETIMES TRUE

## 2025-04-03 SDOH — ECONOMIC STABILITY: FOOD INSECURITY: WITHIN THE PAST 12 MONTHS, THE FOOD YOU BOUGHT JUST DIDN'T LAST AND YOU DIDN'T HAVE MONEY TO GET MORE.: SOMETIMES TRUE

## 2025-04-03 ASSESSMENT — PATIENT HEALTH QUESTIONNAIRE - PHQ9
7. TROUBLE CONCENTRATING ON THINGS, SUCH AS READING THE NEWSPAPER OR WATCHING TELEVISION: SEVERAL DAYS
SUM OF ALL RESPONSES TO PHQ QUESTIONS 1-9: 18
8. MOVING OR SPEAKING SO SLOWLY THAT OTHER PEOPLE COULD HAVE NOTICED. OR THE OPPOSITE, BEING SO FIGETY OR RESTLESS THAT YOU HAVE BEEN MOVING AROUND A LOT MORE THAN USUAL: NOT AT ALL
SUM OF ALL RESPONSES TO PHQ QUESTIONS 1-9: 18
3. TROUBLE FALLING OR STAYING ASLEEP: NEARLY EVERY DAY
5. POOR APPETITE OR OVEREATING: NEARLY EVERY DAY
4. FEELING TIRED OR HAVING LITTLE ENERGY: NEARLY EVERY DAY
6. FEELING BAD ABOUT YOURSELF - OR THAT YOU ARE A FAILURE OR HAVE LET YOURSELF OR YOUR FAMILY DOWN: SEVERAL DAYS
9. THOUGHTS THAT YOU WOULD BE BETTER OFF DEAD, OR OF HURTING YOURSELF: SEVERAL DAYS
10. IF YOU CHECKED OFF ANY PROBLEMS, HOW DIFFICULT HAVE THESE PROBLEMS MADE IT FOR YOU TO DO YOUR WORK, TAKE CARE OF THINGS AT HOME, OR GET ALONG WITH OTHER PEOPLE: EXTREMELY DIFFICULT
1. LITTLE INTEREST OR PLEASURE IN DOING THINGS: NEARLY EVERY DAY
2. FEELING DOWN, DEPRESSED OR HOPELESS: NEARLY EVERY DAY
SUM OF ALL RESPONSES TO PHQ QUESTIONS 1-9: 18
SUM OF ALL RESPONSES TO PHQ QUESTIONS 1-9: 17

## 2025-04-03 ASSESSMENT — ANXIETY QUESTIONNAIRES
6. BECOMING EASILY ANNOYED OR IRRITABLE: NEARLY EVERY DAY
IF YOU CHECKED OFF ANY PROBLEMS ON THIS QUESTIONNAIRE, HOW DIFFICULT HAVE THESE PROBLEMS MADE IT FOR YOU TO DO YOUR WORK, TAKE CARE OF THINGS AT HOME, OR GET ALONG WITH OTHER PEOPLE: EXTREMELY DIFFICULT
3. WORRYING TOO MUCH ABOUT DIFFERENT THINGS: NEARLY EVERY DAY
2. NOT BEING ABLE TO STOP OR CONTROL WORRYING: NEARLY EVERY DAY
4. TROUBLE RELAXING: NEARLY EVERY DAY
1. FEELING NERVOUS, ANXIOUS, OR ON EDGE: NEARLY EVERY DAY
7. FEELING AFRAID AS IF SOMETHING AWFUL MIGHT HAPPEN: NEARLY EVERY DAY
GAD7 TOTAL SCORE: 21
5. BEING SO RESTLESS THAT IT IS HARD TO SIT STILL: NEARLY EVERY DAY

## 2025-04-03 ASSESSMENT — COLUMBIA-SUICIDE SEVERITY RATING SCALE - C-SSRS
2. HAVE YOU ACTUALLY HAD ANY THOUGHTS OF KILLING YOURSELF?: NO
1. WITHIN THE PAST MONTH, HAVE YOU WISHED YOU WERE DEAD OR WISHED YOU COULD GO TO SLEEP AND NOT WAKE UP?: NO
6. HAVE YOU EVER DONE ANYTHING, STARTED TO DO ANYTHING, OR PREPARED TO DO ANYTHING TO END YOUR LIFE?: NO

## 2025-04-03 NOTE — PROGRESS NOTES
\"Have you been to the ER, urgent care clinic since your last visit?  Hospitalized since your last visit?\"    YES - When: approximately 7 months ago.  Where and Why: Pt went to [facility] for a panic attack.    “Have you seen or consulted any other health care providers outside our system since your last visit?”    NO

## 2025-04-04 ENCOUNTER — HOSPITAL ENCOUNTER (OUTPATIENT)
Facility: HOSPITAL | Age: 20
Setting detail: SPECIMEN
Discharge: HOME | End: 2025-04-07

## 2025-04-04 LAB
BASOPHILS # BLD: 1 % (ref 0–2)
BASOPHILS ABSOLUTE: 0.1 K/UL (ref 0–0.2)
EOSINOPHIL # BLD: 1 % (ref 0–6)
EOSINOPHILS ABSOLUTE: 0.1 K/UL (ref 0–0.5)
HCT VFR BLD CALC: 43.6 % (ref 36.6–51.9)
HEMOGLOBIN: 14.7 G/DL (ref 13.2–17.3)
HIV INTERPRETATION: NORMAL
HIV1/0/2 AB/AG: NON REACTIVE
LYMPHOCYTES # BLD: 31 % (ref 20–45)
LYMPHOCYTES ABSOLUTE: 2.1 K/UL (ref 1–4.8)
MCH RBC QN AUTO: 31 PG (ref 26–34)
MCHC RBC AUTO-ENTMCNC: 34 G/DL (ref 31–36)
MCV RBC AUTO: 93 FL (ref 80–95)
MONOCYTES ABSOLUTE: 0.8 K/UL (ref 0.1–1)
MONOCYTES: 12 % (ref 3–12)
NEUTROPHILS ABSOLUTE: 3.6 K/UL (ref 1.8–7.7)
NEUTROPHILS SEGMENTED: 55 % (ref 40–75)
PDW BLD-RTO: 11.7 % (ref 10–15.5)
PLATELET # BLD: 407 K/UL (ref 140–440)
PMV BLD AUTO: 12.1 FL (ref 9–13)
RBC # BLD: 4.69 M/UL (ref 3.8–5.8)
SENTARA SPECIMEN COLLECTION: NORMAL
WBC # BLD: 6.6 K/UL (ref 4–11)

## 2025-04-04 PROCEDURE — 99001 SPECIMEN HANDLING PT-LAB: CPT

## 2025-04-05 LAB
A/G RATIO: 1.7 RATIO (ref 1.1–2.6)
ALBUMIN: 4.5 G/DL (ref 3.5–5)
ALP BLD-CCNC: 64 U/L (ref 25–115)
ALT SERPL-CCNC: 48 U/L (ref 5–40)
ANION GAP SERPL CALCULATED.3IONS-SCNC: 10 MMOL/L (ref 3–15)
AST SERPL-CCNC: 38 U/L (ref 10–37)
BILIRUB SERPL-MCNC: 0.4 MG/DL (ref 0.2–1.2)
BUN BLDV-MCNC: 12 MG/DL (ref 6–22)
CALCIUM SERPL-MCNC: 9.3 MG/DL (ref 8.4–10.5)
CHLORIDE BLD-SCNC: 102 MMOL/L (ref 98–110)
CHOLESTEROL, TOTAL: 159 MG/DL (ref 110–200)
CHOLESTEROL/HDL RATIO: 4.8 (ref 0–5)
CO2: 27 MMOL/L (ref 20–32)
CREAT SERPL-MCNC: 0.8 MG/DL (ref 0.5–1.2)
GFR, ESTIMATED: >60 ML/MIN/1.73 SQ.M.
GLOBULIN: 2.7 G/DL (ref 2–4)
GLUCOSE: 82 MG/DL (ref 70–99)
HDLC SERPL-MCNC: 33 MG/DL
HEPATITIS C ANTIBODY: NORMAL
LDL CHOLESTEROL: 98 MG/DL (ref 50–99)
LDL/HDL RATIO: 3
NON-HDL CHOLESTEROL: 126 MG/DL
POTASSIUM SERPL-SCNC: 4.7 MMOL/L (ref 3.5–5.5)
SODIUM BLD-SCNC: 139 MMOL/L (ref 133–145)
TOTAL PROTEIN: 7.2 G/DL (ref 6.4–8.3)
TRIGL SERPL-MCNC: 139 MG/DL (ref 40–149)
TSH SERPL DL<=0.05 MIU/L-ACNC: 1.84 MCU/ML (ref 0.27–4.2)
VLDLC SERPL CALC-MCNC: 28 MG/DL (ref 8–30)

## 2025-04-07 ENCOUNTER — RESULTS FOLLOW-UP (OUTPATIENT)
Facility: CLINIC | Age: 20
End: 2025-04-07

## 2025-04-09 ENCOUNTER — TELEPHONE (OUTPATIENT)
Facility: CLINIC | Age: 20
End: 2025-04-09

## 2025-04-09 DIAGNOSIS — F90.0 ATTENTION DEFICIT HYPERACTIVITY DISORDER (ADHD), PREDOMINANTLY INATTENTIVE TYPE: Primary | ICD-10-CM

## 2025-04-09 DIAGNOSIS — F90.0 ATTENTION DEFICIT HYPERACTIVITY DISORDER (ADHD), PREDOMINANTLY INATTENTIVE TYPE: ICD-10-CM

## 2025-04-09 RX ORDER — DEXTROAMPHETAMINE SACCHARATE, AMPHETAMINE ASPARTATE MONOHYDRATE, DEXTROAMPHETAMINE SULFATE AND AMPHETAMINE SULFATE 7.5; 7.5; 7.5; 7.5 MG/1; MG/1; MG/1; MG/1
30 CAPSULE, EXTENDED RELEASE ORAL DAILY
Qty: 30 CAPSULE | Refills: 0 | Status: SHIPPED | OUTPATIENT
Start: 2025-04-09 | End: 2025-05-09

## 2025-04-09 NOTE — TELEPHONE ENCOUNTER
Pt stated that pharmacy stated he needs prior authorization for:    Adderall 30 MG Tablet    [facility]  [address] [facility]  [phone]

## 2025-04-16 ENCOUNTER — TELEPHONE (OUTPATIENT)
Facility: CLINIC | Age: 20
End: 2025-04-16

## 2025-04-16 DIAGNOSIS — L40.9 SCALP PSORIASIS: Primary | ICD-10-CM

## 2025-05-16 ENCOUNTER — OFFICE VISIT (OUTPATIENT)
Facility: CLINIC | Age: 20
End: 2025-05-16

## 2025-05-16 VITALS
OXYGEN SATURATION: 94 % | SYSTOLIC BLOOD PRESSURE: 130 MMHG | DIASTOLIC BLOOD PRESSURE: 82 MMHG | HEART RATE: 79 BPM | BODY MASS INDEX: 34.1 KG/M2 | HEIGHT: 76 IN | WEIGHT: 280 LBS | TEMPERATURE: 98.6 F | RESPIRATION RATE: 18 BRPM

## 2025-05-16 DIAGNOSIS — F90.0 ATTENTION DEFICIT HYPERACTIVITY DISORDER (ADHD), PREDOMINANTLY INATTENTIVE TYPE: ICD-10-CM

## 2025-05-16 DIAGNOSIS — K59.09 OTHER CONSTIPATION: ICD-10-CM

## 2025-05-16 DIAGNOSIS — R52 SHARP PAIN: ICD-10-CM

## 2025-05-16 DIAGNOSIS — R74.8 ELEVATED LIVER ENZYMES: Primary | ICD-10-CM

## 2025-05-16 DIAGNOSIS — G47.33 OSA (OBSTRUCTIVE SLEEP APNEA): ICD-10-CM

## 2025-05-16 SDOH — ECONOMIC STABILITY: FOOD INSECURITY: WITHIN THE PAST 12 MONTHS, YOU WORRIED THAT YOUR FOOD WOULD RUN OUT BEFORE YOU GOT MONEY TO BUY MORE.: NEVER TRUE

## 2025-05-16 SDOH — ECONOMIC STABILITY: FOOD INSECURITY: WITHIN THE PAST 12 MONTHS, THE FOOD YOU BOUGHT JUST DIDN'T LAST AND YOU DIDN'T HAVE MONEY TO GET MORE.: NEVER TRUE

## 2025-05-16 ASSESSMENT — ENCOUNTER SYMPTOMS
COUGH: 0
CHEST TIGHTNESS: 0
CONSTIPATION: 1
SHORTNESS OF BREATH: 0
DIARRHEA: 0
VOMITING: 0
NAUSEA: 0
RHINORRHEA: 0
WHEEZING: 0
BACK PAIN: 0
SORE THROAT: 0
BLOOD IN STOOL: 0
EYE PAIN: 0
ABDOMINAL PAIN: 0

## 2025-05-16 ASSESSMENT — PATIENT HEALTH QUESTIONNAIRE - PHQ9
SUM OF ALL RESPONSES TO PHQ QUESTIONS 1-9: 2
1. LITTLE INTEREST OR PLEASURE IN DOING THINGS: SEVERAL DAYS
2. FEELING DOWN, DEPRESSED OR HOPELESS: SEVERAL DAYS
SUM OF ALL RESPONSES TO PHQ QUESTIONS 1-9: 2

## 2025-05-16 NOTE — PROGRESS NOTES
Dima Nichols is a 19 y.o. male presenting today for Other (Acute visit. Pt states that he is having pain sharp pains in his head. Pt also states that he is taking tylenol for the pain.)  .     Chief Complaint   Patient presents with    Other     Acute visit. Pt states that he is having pain sharp pains in his head. Pt also states that he is taking tylenol for the pain.       HPI:  Dima Nichols presents to the office today for acute visit.     Patient has a PMH of ADHD, autism, seasonal allergies, depression, ALICJA, obesity    Today patient reports sharp pain in between his eyebrows for 2 days, has been taking tylenol with significant relief.  It has now resolved.  He wanted to make sure it was nothing to worry about.  At its worst the pain is 2 out of 10.  Patient reports in the past he has had sharp pains around his scalp but this pain is different.  He denies any tearing from his eyes, blurry vision, numbness or tingling in his face, weakness in any of his extremities.  Patient does mention that he stares at the screen for long periods of times either playing video games, using his phone or watching TV.  He recently saw an optometrist and got prescription for new eyeglasses.  I discussed the elevated liver enzymes on previous labs and need to rule out fatty liver disease.  He notes constipation and need to increase fiber.    Depression: was previously on paroxetine, not on it any more. Goes to therapy 3x a week.  Patient states he would discuss starting medication when he sees them next.  He denies any plans to harm himself.  Feels like ever since he has been off his Adderall medication he has been feeling very unmotivated and down.  Patient lives with his mom and reports that she hovers over him which he does not like.    ADHD: has been off adderall since June last year. On chart review, note from 10/23/24 showed he was previously diagnosed by prior physician who noted a ADHD reported by patient and

## 2025-05-19 ENCOUNTER — HOSPITAL ENCOUNTER (OUTPATIENT)
Facility: HOSPITAL | Age: 20
Setting detail: SPECIMEN
Discharge: HOME | End: 2025-05-22

## 2025-05-19 DIAGNOSIS — R74.8 ELEVATED LIVER ENZYMES: ICD-10-CM

## 2025-05-19 LAB — SENTARA SPECIMEN COLLECTION: NORMAL

## 2025-05-19 PROCEDURE — 99001 SPECIMEN HANDLING PT-LAB: CPT

## 2025-05-19 ASSESSMENT — ENCOUNTER SYMPTOMS
SORE THROAT: 0
BLOOD IN STOOL: 0
VOMITING: 0
EYE PAIN: 0
DIARRHEA: 0
RHINORRHEA: 0
WHEEZING: 0
ABDOMINAL PAIN: 0
NAUSEA: 0
CHEST TIGHTNESS: 0
SHORTNESS OF BREATH: 0
COUGH: 0
BACK PAIN: 0

## 2025-05-20 LAB
A/G RATIO: 1.7 RATIO (ref 1.1–2.6)
ALBUMIN: 4.2 G/DL (ref 3.5–5)
ALP BLD-CCNC: 67 U/L (ref 25–115)
ALT SERPL-CCNC: 47 U/L (ref 5–40)
AST SERPL-CCNC: 59 U/L (ref 10–37)
BILIRUB SERPL-MCNC: 0.3 MG/DL (ref 0.2–1.2)
BILIRUBIN DIRECT: <0.2 MG/DL (ref 0–0.3)
GLOBULIN: 2.5 G/DL (ref 2–4)
TOTAL PROTEIN: 6.7 G/DL (ref 6.4–8.3)

## 2025-05-21 ENCOUNTER — OFFICE VISIT (OUTPATIENT)
Facility: CLINIC | Age: 20
End: 2025-05-21
Payer: MEDICAID

## 2025-05-21 VITALS
HEIGHT: 76 IN | HEART RATE: 90 BPM | SYSTOLIC BLOOD PRESSURE: 126 MMHG | WEIGHT: 283 LBS | TEMPERATURE: 100 F | BODY MASS INDEX: 34.46 KG/M2 | DIASTOLIC BLOOD PRESSURE: 80 MMHG | RESPIRATION RATE: 18 BRPM | OXYGEN SATURATION: 95 %

## 2025-05-21 DIAGNOSIS — F90.0 ATTENTION DEFICIT HYPERACTIVITY DISORDER (ADHD), PREDOMINANTLY INATTENTIVE TYPE: ICD-10-CM

## 2025-05-21 DIAGNOSIS — R74.8 ELEVATED LIVER ENZYMES: Primary | ICD-10-CM

## 2025-05-21 DIAGNOSIS — J30.2 SEASONAL ALLERGIES: ICD-10-CM

## 2025-05-21 DIAGNOSIS — Z23 ENCOUNTER FOR IMMUNIZATION: ICD-10-CM

## 2025-05-21 PROCEDURE — G2211 COMPLEX E/M VISIT ADD ON: HCPCS | Performed by: STUDENT IN AN ORGANIZED HEALTH CARE EDUCATION/TRAINING PROGRAM

## 2025-05-21 PROCEDURE — 90715 TDAP VACCINE 7 YRS/> IM: CPT | Performed by: STUDENT IN AN ORGANIZED HEALTH CARE EDUCATION/TRAINING PROGRAM

## 2025-05-21 PROCEDURE — 99214 OFFICE O/P EST MOD 30 MIN: CPT | Performed by: STUDENT IN AN ORGANIZED HEALTH CARE EDUCATION/TRAINING PROGRAM

## 2025-05-21 PROCEDURE — 90471 IMMUNIZATION ADMIN: CPT | Performed by: STUDENT IN AN ORGANIZED HEALTH CARE EDUCATION/TRAINING PROGRAM

## 2025-05-21 SDOH — ECONOMIC STABILITY: FOOD INSECURITY: WITHIN THE PAST 12 MONTHS, THE FOOD YOU BOUGHT JUST DIDN'T LAST AND YOU DIDN'T HAVE MONEY TO GET MORE.: NEVER TRUE

## 2025-05-21 SDOH — ECONOMIC STABILITY: FOOD INSECURITY: WITHIN THE PAST 12 MONTHS, YOU WORRIED THAT YOUR FOOD WOULD RUN OUT BEFORE YOU GOT MONEY TO BUY MORE.: NEVER TRUE

## 2025-05-21 ASSESSMENT — PATIENT HEALTH QUESTIONNAIRE - PHQ9
SUM OF ALL RESPONSES TO PHQ QUESTIONS 1-9: 0
1. LITTLE INTEREST OR PLEASURE IN DOING THINGS: NOT AT ALL
2. FEELING DOWN, DEPRESSED OR HOPELESS: NOT AT ALL
SUM OF ALL RESPONSES TO PHQ QUESTIONS 1-9: 0

## 2025-05-21 ASSESSMENT — ENCOUNTER SYMPTOMS: CONSTIPATION: 0

## 2025-05-21 NOTE — PROGRESS NOTES
Have you been to the ER, urgent care clinic since your last visit?  Hospitalized since your last visit?   NO    Have you seen or consulted any other health care providers outside our system since your last visit?   NO            
and not been exercising as much.  Patient has been using weights at home and walking more to lose weight.          Review of Systems   Constitutional:  Negative for chills, fatigue, fever and unexpected weight change.   HENT:  Negative for rhinorrhea and sore throat.    Eyes:  Negative for pain.   Respiratory:  Negative for cough, chest tightness, shortness of breath and wheezing.    Cardiovascular:  Negative for chest pain, palpitations and leg swelling.   Gastrointestinal:  Negative for abdominal pain, blood in stool, constipation, diarrhea, nausea and vomiting.   Genitourinary:  Negative for dysuria.   Musculoskeletal:  Negative for arthralgias, back pain and gait problem.   Skin:  Negative for rash.   Neurological:  Negative for dizziness, tremors, weakness, numbness and headaches.   Psychiatric/Behavioral:  Negative for confusion and sleep disturbance.          No Known Allergies    PHQ Screening       5/21/2025     9:07 AM 5/16/2025     8:06 AM 4/3/2025     3:04 PM   PHQ-9 Questionaire   Little interest or pleasure in doing things 0 1 3   Feeling down, depressed, or hopeless 0 1 3   Trouble falling or staying asleep, or sleeping too much   3   Feeling tired or having little energy   3   Poor appetite or overeating   3   Feeling bad about yourself - or that you are a failure or have let yourself or your family down   1   Trouble concentrating on things, such as reading the newspaper or watching television   1   Moving or speaking so slowly that other people could have noticed. Or the opposite - being so fidgety or restless that you have been moving around a lot more than usual   0   Thoughts that you would be better off dead, or of hurting yourself in some way   1   PHQ-9 Total Score 0 2 18   If you checked off any problems, how difficult have these problems made it for you to do your work, take care of things at home, or get along with other people?   3         5/21/2025     9:08 AM 5/16/2025     8:06 AM

## 2025-05-30 ENCOUNTER — OFFICE VISIT (OUTPATIENT)
Facility: CLINIC | Age: 20
End: 2025-05-30
Payer: MEDICAID

## 2025-05-30 VITALS
TEMPERATURE: 98.8 F | OXYGEN SATURATION: 95 % | BODY MASS INDEX: 35.44 KG/M2 | HEART RATE: 62 BPM | SYSTOLIC BLOOD PRESSURE: 119 MMHG | DIASTOLIC BLOOD PRESSURE: 87 MMHG | HEIGHT: 76 IN | WEIGHT: 291 LBS | RESPIRATION RATE: 18 BRPM

## 2025-05-30 DIAGNOSIS — F90.0 ATTENTION DEFICIT HYPERACTIVITY DISORDER (ADHD), PREDOMINANTLY INATTENTIVE TYPE: ICD-10-CM

## 2025-05-30 DIAGNOSIS — R51.9 INTRACTABLE HEADACHE, UNSPECIFIED CHRONICITY PATTERN, UNSPECIFIED HEADACHE TYPE: ICD-10-CM

## 2025-05-30 DIAGNOSIS — H52.223 REGULAR ASTIGMATISM OF BOTH EYES: Primary | ICD-10-CM

## 2025-05-30 PROCEDURE — 99213 OFFICE O/P EST LOW 20 MIN: CPT | Performed by: STUDENT IN AN ORGANIZED HEALTH CARE EDUCATION/TRAINING PROGRAM

## 2025-05-30 RX ORDER — ACETAMINOPHEN 500 MG
500 TABLET ORAL 4 TIMES DAILY PRN
Qty: 120 TABLET | Refills: 0 | Status: SHIPPED | OUTPATIENT
Start: 2025-05-30

## 2025-05-30 RX ORDER — DEXTROAMPHETAMINE SACCHARATE, AMPHETAMINE ASPARTATE MONOHYDRATE, DEXTROAMPHETAMINE SULFATE AND AMPHETAMINE SULFATE 7.5; 7.5; 7.5; 7.5 MG/1; MG/1; MG/1; MG/1
30 CAPSULE, EXTENDED RELEASE ORAL DAILY
Qty: 30 CAPSULE | Refills: 0 | Status: SHIPPED | OUTPATIENT
Start: 2025-05-30 | End: 2025-06-29

## 2025-05-30 SDOH — ECONOMIC STABILITY: FOOD INSECURITY: WITHIN THE PAST 12 MONTHS, YOU WORRIED THAT YOUR FOOD WOULD RUN OUT BEFORE YOU GOT MONEY TO BUY MORE.: NEVER TRUE

## 2025-05-30 SDOH — ECONOMIC STABILITY: FOOD INSECURITY: WITHIN THE PAST 12 MONTHS, THE FOOD YOU BOUGHT JUST DIDN'T LAST AND YOU DIDN'T HAVE MONEY TO GET MORE.: NEVER TRUE

## 2025-05-30 ASSESSMENT — ENCOUNTER SYMPTOMS
DIARRHEA: 0
EYE PAIN: 0
ABDOMINAL PAIN: 0
COUGH: 0
WHEEZING: 0
RHINORRHEA: 0
BLOOD IN STOOL: 0
NAUSEA: 0
SHORTNESS OF BREATH: 0
CHEST TIGHTNESS: 0
SORE THROAT: 0
VOMITING: 0
BACK PAIN: 0
CONSTIPATION: 0

## 2025-05-30 ASSESSMENT — PATIENT HEALTH QUESTIONNAIRE - PHQ9
2. FEELING DOWN, DEPRESSED OR HOPELESS: NOT AT ALL
SUM OF ALL RESPONSES TO PHQ QUESTIONS 1-9: 0
1. LITTLE INTEREST OR PLEASURE IN DOING THINGS: NOT AT ALL
SUM OF ALL RESPONSES TO PHQ QUESTIONS 1-9: 0

## 2025-05-30 NOTE — PROGRESS NOTES
Dima Nichols is a 19 y.o. male presenting today for Follow-up (Acute visit.) and Other  .     Chief Complaint   Patient presents with    Follow-up     Acute visit.    Other       HPI:  Dima Nichols presents to the office today for acute visit.     Patient has a PMH of ADHD, autism, seasonal allergies, depression, ALICJA, obesity    Today patient reports mild pain over his right orbit. Dull in nature and intermittent. This is associated with watching screens and also light sensitivity. Patient saw optometry early in the year and got prescription for likely astigmatism but has not yet ordered his glasses. He states he will do so today.  He states he has reduced his screen time a bit. Symptoms have now resolved.    Depression: was previously on paroxetine, not on it any more. Goes to therapy 3x a week.  Patient states he would discuss starting medication when he sees them next.  He denies any plans to harm himself.  Recalls that being off his Adderall medication caused him to feel unmotivated and down.  Patient lives with his mom and reports that she hovers over him which he does not like.    ADHD: has been off adderall since June last year. On chart review, note from 10/23/24 showed he was previously diagnosed by prior physician who noted a ADHD reported by patient and at school. Patient was poorly organized, unable to focus, daydreaming in class, impulsive with interference with work. AT our initial visit, I restarted him on adderall.  He reports doing well with adderrall.    ALICJA: had sleep study as a child, was told he had a seizure while sleeping.  Patient has not had any seizures since then.  Reports daytime sleepiness, insomnia and waking up at 3 AM with inability to fall back asleep.     Obesity: Patient Was previously eating a lot of processed foods and not been exercising as much.  Patient has been using weights at home and walking more to lose weight.      Review of Systems   Constitutional:

## 2025-06-04 ENCOUNTER — HOSPITAL ENCOUNTER (OUTPATIENT)
Facility: HOSPITAL | Age: 20
Discharge: HOME | End: 2025-06-07
Attending: STUDENT IN AN ORGANIZED HEALTH CARE EDUCATION/TRAINING PROGRAM
Payer: MEDICAID

## 2025-06-04 DIAGNOSIS — R74.8 ELEVATED LIVER ENZYMES: ICD-10-CM

## 2025-06-04 PROBLEM — F90.9 ATTENTION DEFICIT HYPERACTIVITY DISORDER: Status: ACTIVE | Noted: 2025-06-04

## 2025-06-04 PROCEDURE — 76705 ECHO EXAM OF ABDOMEN: CPT

## 2025-06-05 ENCOUNTER — TELEMEDICINE (OUTPATIENT)
Facility: CLINIC | Age: 20
End: 2025-06-05
Payer: MEDICAID

## 2025-06-05 DIAGNOSIS — K76.0 HEPATIC STEATOSIS: ICD-10-CM

## 2025-06-05 DIAGNOSIS — R21 SKIN RASH: Primary | ICD-10-CM

## 2025-06-05 PROCEDURE — G2211 COMPLEX E/M VISIT ADD ON: HCPCS | Performed by: STUDENT IN AN ORGANIZED HEALTH CARE EDUCATION/TRAINING PROGRAM

## 2025-06-05 PROCEDURE — 99214 OFFICE O/P EST MOD 30 MIN: CPT | Performed by: STUDENT IN AN ORGANIZED HEALTH CARE EDUCATION/TRAINING PROGRAM

## 2025-06-05 SDOH — ECONOMIC STABILITY: FOOD INSECURITY: WITHIN THE PAST 12 MONTHS, THE FOOD YOU BOUGHT JUST DIDN'T LAST AND YOU DIDN'T HAVE MONEY TO GET MORE.: NEVER TRUE

## 2025-06-05 SDOH — ECONOMIC STABILITY: FOOD INSECURITY: WITHIN THE PAST 12 MONTHS, YOU WORRIED THAT YOUR FOOD WOULD RUN OUT BEFORE YOU GOT MONEY TO BUY MORE.: NEVER TRUE

## 2025-06-05 ASSESSMENT — PATIENT HEALTH QUESTIONNAIRE - PHQ9
SUM OF ALL RESPONSES TO PHQ QUESTIONS 1-9: 0
2. FEELING DOWN, DEPRESSED OR HOPELESS: NOT AT ALL
SUM OF ALL RESPONSES TO PHQ QUESTIONS 1-9: 0
1. LITTLE INTEREST OR PLEASURE IN DOING THINGS: NOT AT ALL
SUM OF ALL RESPONSES TO PHQ QUESTIONS 1-9: 0
SUM OF ALL RESPONSES TO PHQ QUESTIONS 1-9: 0

## 2025-06-05 NOTE — PROGRESS NOTES
Abnormal - difficult to illicit bumps on the back of his neck. The bumps on his thigh are gone.           Psychiatric:       [x] Normal Affect [] Abnormal -        [x] No Hallucinations    Other pertinent observable physical exam findings:-           --Dagmar Richardson MD

## 2025-06-25 ENCOUNTER — TELEMEDICINE (OUTPATIENT)
Facility: CLINIC | Age: 20
End: 2025-06-25
Payer: MEDICAID

## 2025-06-25 DIAGNOSIS — K64.9 HEMORRHOIDS, UNSPECIFIED HEMORRHOID TYPE: ICD-10-CM

## 2025-06-25 DIAGNOSIS — K59.01 SLOW TRANSIT CONSTIPATION: Primary | ICD-10-CM

## 2025-06-25 PROCEDURE — 99214 OFFICE O/P EST MOD 30 MIN: CPT | Performed by: STUDENT IN AN ORGANIZED HEALTH CARE EDUCATION/TRAINING PROGRAM

## 2025-06-25 RX ORDER — BENZOCAINE/MENTHOL 6 MG-10 MG
LOZENGE MUCOUS MEMBRANE
Qty: 30 G | Refills: 1 | Status: SHIPPED | OUTPATIENT
Start: 2025-06-25 | End: 2025-07-02

## 2025-06-25 RX ORDER — POLYETHYLENE GLYCOL 3350 17 G/17G
17 POWDER, FOR SOLUTION ORAL DAILY PRN
Qty: 510 G | Refills: 0 | Status: SHIPPED | OUTPATIENT
Start: 2025-06-25 | End: 2025-07-25

## 2025-06-25 SDOH — ECONOMIC STABILITY: FOOD INSECURITY: WITHIN THE PAST 12 MONTHS, YOU WORRIED THAT YOUR FOOD WOULD RUN OUT BEFORE YOU GOT MONEY TO BUY MORE.: NEVER TRUE

## 2025-06-25 SDOH — ECONOMIC STABILITY: FOOD INSECURITY: WITHIN THE PAST 12 MONTHS, THE FOOD YOU BOUGHT JUST DIDN'T LAST AND YOU DIDN'T HAVE MONEY TO GET MORE.: NEVER TRUE

## 2025-06-25 NOTE — PROGRESS NOTES
Dima Nichols, was evaluated through a synchronous (real-time) audio-video encounter. The patient (or guardian if applicable) is aware that this is a billable service, which includes applicable co-pays. This Virtual Visit was conducted with patient's (and/or legal guardian's) consent. Patient identification was verified, and a caregiver was present when appropriate.   The patient was located at Home: [address] [facility] [address]  Provider was located at Facility (Appt Dept): [facility] [address]  [place] [address]  Confirm you are appropriately licensed, registered, or certified to deliver care in the state where the patient is located as indicated above. If you are not or unsure, please re-schedule the visit: Yes, I confirm.     Dima Nichols (:  2005) is a Established patient, presenting virtually for evaluation of the following:      Below is the assessment and plan developed based on review of pertinent history, physical exam, labs, studies, and medications.     Assessment & Plan  Slow transit constipation   Chronic, not at goal (unstable), had to sit on the toilet for an hour yesterday until he had a bowel movement. Then experienced anal pain. Denies any blood in stool , medication adherence emphasized, and lifestyle modifications recommended    Orders:    polyethylene glycol (GLYCOLAX) 17 GM/SCOOP powder; Take 17 g by mouth daily as needed (for constipation)    Hemorrhoids, unspecified hemorrhoid type   Chronic, not at goal (unstable), denies any blood in stool but reports anal pain. In the past saw spots of bright red blood on tissue paper.    Orders:    hydrocortisone (ALA-LAYTON) 1 % cream; Apply topically 2 times daily.      No follow-ups on file.       Subjective   ADHD, autism, seasonal allergies, depression, ALICJA, obesity    Reports 2 days ago he had an upset stomach, using bathroom every 1-2 hours, straining. He was constipated and would sit on the toilet for 1

## 2025-07-18 ENCOUNTER — TELEMEDICINE (OUTPATIENT)
Facility: CLINIC | Age: 20
End: 2025-07-18

## 2025-07-18 DIAGNOSIS — R63.0 LOSS OF APPETITE: Primary | ICD-10-CM

## 2025-07-18 DIAGNOSIS — F41.9 ANXIETY: ICD-10-CM

## 2025-07-18 SDOH — ECONOMIC STABILITY: FOOD INSECURITY: WITHIN THE PAST 12 MONTHS, YOU WORRIED THAT YOUR FOOD WOULD RUN OUT BEFORE YOU GOT MONEY TO BUY MORE.: NEVER TRUE

## 2025-07-18 SDOH — ECONOMIC STABILITY: FOOD INSECURITY: WITHIN THE PAST 12 MONTHS, THE FOOD YOU BOUGHT JUST DIDN'T LAST AND YOU DIDN'T HAVE MONEY TO GET MORE.: NEVER TRUE

## 2025-07-18 ASSESSMENT — PATIENT HEALTH QUESTIONNAIRE - PHQ9
SUM OF ALL RESPONSES TO PHQ QUESTIONS 1-9: 2
2. FEELING DOWN, DEPRESSED OR HOPELESS: SEVERAL DAYS
SUM OF ALL RESPONSES TO PHQ QUESTIONS 1-9: 2
SUM OF ALL RESPONSES TO PHQ QUESTIONS 1-9: 2
1. LITTLE INTEREST OR PLEASURE IN DOING THINGS: SEVERAL DAYS
SUM OF ALL RESPONSES TO PHQ QUESTIONS 1-9: 2

## 2025-07-18 ASSESSMENT — ENCOUNTER SYMPTOMS
EYE PAIN: 0
WHEEZING: 0
VOMITING: 0
CHEST TIGHTNESS: 0
CONSTIPATION: 0
ABDOMINAL PAIN: 0
RHINORRHEA: 0
BACK PAIN: 0
SHORTNESS OF BREATH: 0
DIARRHEA: 0
BLOOD IN STOOL: 0
COUGH: 0
NAUSEA: 0
SORE THROAT: 0

## 2025-07-18 NOTE — PROGRESS NOTES
Have you been to the ER, urgent care clinic since your last visit?  Hospitalized since your last visit?   NO    Have you seen or consulted any other health care providers outside our system since your last visit?   NO          
states he is current weight is 254 pounds.  The last time I saw him in May he weighed 291 pounds.  Patient reports he has not been exercising or increasing his physical activity.  He reports this happened a few summers ago when he \"lost almost 70 pounds in a span of 5 months\".  Patient reports he notices spicy foods and milk cause is an abdominal ache and also eating foods like red meat.  He states the red meat takes a lot of time to digest.  Patient states he feels his anxiety could be contributing to his lack of appetite.  He is currently seeing a therapist but is not on any medication.         Review of Systems   Constitutional:  Positive for appetite change and unexpected weight change. Negative for activity change, chills, fatigue and fever.   HENT:  Negative for rhinorrhea and sore throat.    Eyes:  Negative for pain.   Respiratory:  Negative for cough, chest tightness, shortness of breath and wheezing.    Cardiovascular:  Negative for chest pain, palpitations and leg swelling.   Gastrointestinal:  Negative for abdominal pain, blood in stool, constipation, diarrhea, nausea and vomiting.   Genitourinary:  Negative for dysuria.   Musculoskeletal:  Negative for arthralgias, back pain and gait problem.   Skin:  Negative for rash.   Neurological:  Negative for dizziness, tremors, weakness, numbness and headaches.   Psychiatric/Behavioral:  Negative for confusion and sleep disturbance.           Objective   Patient-Reported Vitals  No data recorded     Physical Exam  [INSTRUCTIONS:  \"[x]\" Indicates a positive item  \"[]\" Indicates a negative item  -- DELETE ALL ITEMS NOT EXAMINED]    Constitutional: [x] Appears well-developed and well-nourished [x] No apparent distress      [] Abnormal -     Mental status: [x] Alert and awake  [x] Oriented to person/place/time [x] Able to follow commands    [] Abnormal -     Eyes:   EOM    [x]  Normal    [] Abnormal -   Sclera  [x]  Normal    [] Abnormal -          Discharge [x]

## 2025-08-14 ENCOUNTER — OFFICE VISIT (OUTPATIENT)
Facility: CLINIC | Age: 20
End: 2025-08-14
Payer: MEDICAID

## 2025-08-14 VITALS
WEIGHT: 263 LBS | RESPIRATION RATE: 18 BRPM | HEIGHT: 76 IN | DIASTOLIC BLOOD PRESSURE: 58 MMHG | OXYGEN SATURATION: 96 % | HEART RATE: 85 BPM | TEMPERATURE: 99.3 F | SYSTOLIC BLOOD PRESSURE: 120 MMHG | BODY MASS INDEX: 32.03 KG/M2

## 2025-08-14 DIAGNOSIS — L70.8 ACNE-LIKE SKIN BUMPS: Primary | ICD-10-CM

## 2025-08-14 DIAGNOSIS — G47.33 OSA (OBSTRUCTIVE SLEEP APNEA): ICD-10-CM

## 2025-08-14 DIAGNOSIS — F90.0 ATTENTION DEFICIT HYPERACTIVITY DISORDER (ADHD), PREDOMINANTLY INATTENTIVE TYPE: ICD-10-CM

## 2025-08-14 DIAGNOSIS — K59.01 SLOW TRANSIT CONSTIPATION: ICD-10-CM

## 2025-08-14 PROCEDURE — 99214 OFFICE O/P EST MOD 30 MIN: CPT | Performed by: STUDENT IN AN ORGANIZED HEALTH CARE EDUCATION/TRAINING PROGRAM

## 2025-08-14 PROCEDURE — G2211 COMPLEX E/M VISIT ADD ON: HCPCS | Performed by: STUDENT IN AN ORGANIZED HEALTH CARE EDUCATION/TRAINING PROGRAM

## 2025-08-14 RX ORDER — DEXTROAMPHETAMINE SACCHARATE, AMPHETAMINE ASPARTATE MONOHYDRATE, DEXTROAMPHETAMINE SULFATE AND AMPHETAMINE SULFATE 7.5; 7.5; 7.5; 7.5 MG/1; MG/1; MG/1; MG/1
30 CAPSULE, EXTENDED RELEASE ORAL DAILY
Qty: 90 CAPSULE | Refills: 0 | Status: SHIPPED | OUTPATIENT
Start: 2025-08-14 | End: 2025-11-12

## 2025-08-14 RX ORDER — PSEUDOEPHEDRINE HCL 30 MG
100 TABLET ORAL DAILY
Qty: 60 CAPSULE | Refills: 1 | Status: SHIPPED | OUTPATIENT
Start: 2025-08-14

## 2025-08-14 SDOH — ECONOMIC STABILITY: FOOD INSECURITY: WITHIN THE PAST 12 MONTHS, THE FOOD YOU BOUGHT JUST DIDN'T LAST AND YOU DIDN'T HAVE MONEY TO GET MORE.: NEVER TRUE

## 2025-08-14 SDOH — ECONOMIC STABILITY: FOOD INSECURITY: WITHIN THE PAST 12 MONTHS, YOU WORRIED THAT YOUR FOOD WOULD RUN OUT BEFORE YOU GOT MONEY TO BUY MORE.: NEVER TRUE

## 2025-08-14 ASSESSMENT — ENCOUNTER SYMPTOMS
CHEST TIGHTNESS: 0
SORE THROAT: 0
BLOOD IN STOOL: 0
DIARRHEA: 0
ABDOMINAL PAIN: 0
EYE PAIN: 0
SHORTNESS OF BREATH: 0
NAUSEA: 0
COUGH: 0
ROS SKIN COMMENTS: SKIN BUMP
WHEEZING: 0
CONSTIPATION: 0
RHINORRHEA: 0
BACK PAIN: 0
VOMITING: 0

## 2025-08-14 ASSESSMENT — PATIENT HEALTH QUESTIONNAIRE - PHQ9
SUM OF ALL RESPONSES TO PHQ QUESTIONS 1-9: 2
2. FEELING DOWN, DEPRESSED OR HOPELESS: SEVERAL DAYS
SUM OF ALL RESPONSES TO PHQ QUESTIONS 1-9: 2
1. LITTLE INTEREST OR PLEASURE IN DOING THINGS: SEVERAL DAYS

## 2025-08-20 ENCOUNTER — OFFICE VISIT (OUTPATIENT)
Facility: CLINIC | Age: 20
End: 2025-08-20
Payer: MEDICAID

## 2025-08-20 VITALS
RESPIRATION RATE: 18 BRPM | SYSTOLIC BLOOD PRESSURE: 125 MMHG | WEIGHT: 271 LBS | DIASTOLIC BLOOD PRESSURE: 71 MMHG | BODY MASS INDEX: 33 KG/M2 | HEART RATE: 76 BPM | HEIGHT: 76 IN | TEMPERATURE: 98.3 F | OXYGEN SATURATION: 93 %

## 2025-08-20 DIAGNOSIS — L85.3 DRY SKIN DERMATITIS: Primary | ICD-10-CM

## 2025-08-20 PROCEDURE — 99213 OFFICE O/P EST LOW 20 MIN: CPT | Performed by: STUDENT IN AN ORGANIZED HEALTH CARE EDUCATION/TRAINING PROGRAM

## 2025-08-20 RX ORDER — AMMONIUM LACTATE 5 %
1 LOTION (GRAM) TOPICAL PRN
Qty: 222 ML | Refills: 3 | Status: SHIPPED | OUTPATIENT
Start: 2025-08-20

## 2025-08-20 SDOH — ECONOMIC STABILITY: FOOD INSECURITY: WITHIN THE PAST 12 MONTHS, YOU WORRIED THAT YOUR FOOD WOULD RUN OUT BEFORE YOU GOT MONEY TO BUY MORE.: NEVER TRUE

## 2025-08-20 SDOH — ECONOMIC STABILITY: FOOD INSECURITY: WITHIN THE PAST 12 MONTHS, THE FOOD YOU BOUGHT JUST DIDN'T LAST AND YOU DIDN'T HAVE MONEY TO GET MORE.: NEVER TRUE

## 2025-08-20 ASSESSMENT — PATIENT HEALTH QUESTIONNAIRE - PHQ9
SUM OF ALL RESPONSES TO PHQ QUESTIONS 1-9: 0
SUM OF ALL RESPONSES TO PHQ QUESTIONS 1-9: 0
2. FEELING DOWN, DEPRESSED OR HOPELESS: NOT AT ALL
1. LITTLE INTEREST OR PLEASURE IN DOING THINGS: NOT AT ALL
SUM OF ALL RESPONSES TO PHQ QUESTIONS 1-9: 0
SUM OF ALL RESPONSES TO PHQ QUESTIONS 1-9: 0

## 2025-08-20 ASSESSMENT — ENCOUNTER SYMPTOMS
BACK PAIN: 0
SORE THROAT: 0
CHEST TIGHTNESS: 0
VOMITING: 0
ROS SKIN COMMENTS: DRY SKIN
COUGH: 0
BLOOD IN STOOL: 0
NAUSEA: 0
ABDOMINAL PAIN: 0
CONSTIPATION: 0
DIARRHEA: 0
RHINORRHEA: 0
SHORTNESS OF BREATH: 0
EYE PAIN: 0
WHEEZING: 0

## 2025-08-25 ENCOUNTER — OFFICE VISIT (OUTPATIENT)
Facility: CLINIC | Age: 20
End: 2025-08-25
Payer: MEDICAID

## 2025-08-25 VITALS
HEART RATE: 56 BPM | SYSTOLIC BLOOD PRESSURE: 130 MMHG | BODY MASS INDEX: 32.39 KG/M2 | HEIGHT: 76 IN | TEMPERATURE: 98.5 F | DIASTOLIC BLOOD PRESSURE: 65 MMHG | OXYGEN SATURATION: 95 % | WEIGHT: 266 LBS | RESPIRATION RATE: 18 BRPM

## 2025-08-25 DIAGNOSIS — H61.23 IMPACTED CERUMEN OF BOTH EARS: Primary | ICD-10-CM

## 2025-08-25 PROCEDURE — 99214 OFFICE O/P EST MOD 30 MIN: CPT | Performed by: STUDENT IN AN ORGANIZED HEALTH CARE EDUCATION/TRAINING PROGRAM

## 2025-08-25 PROCEDURE — 69210 REMOVE IMPACTED EAR WAX UNI: CPT | Performed by: STUDENT IN AN ORGANIZED HEALTH CARE EDUCATION/TRAINING PROGRAM

## 2025-08-25 RX ORDER — CARBAMIDE PEROXIDE - 6.5% 65 MG/ML
5 SOLUTION/ DROPS TOPICAL 2 TIMES DAILY PRN
Qty: 15 ML | Refills: 1 | Status: SHIPPED | OUTPATIENT
Start: 2025-08-25 | End: 2025-10-24

## 2025-08-25 SDOH — ECONOMIC STABILITY: FOOD INSECURITY: WITHIN THE PAST 12 MONTHS, YOU WORRIED THAT YOUR FOOD WOULD RUN OUT BEFORE YOU GOT MONEY TO BUY MORE.: NEVER TRUE

## 2025-08-25 SDOH — ECONOMIC STABILITY: FOOD INSECURITY: WITHIN THE PAST 12 MONTHS, THE FOOD YOU BOUGHT JUST DIDN'T LAST AND YOU DIDN'T HAVE MONEY TO GET MORE.: NEVER TRUE

## 2025-08-25 ASSESSMENT — ENCOUNTER SYMPTOMS
SORE THROAT: 0
SHORTNESS OF BREATH: 0
ROS SKIN COMMENTS: DRY SKIN
WHEEZING: 0
CHEST TIGHTNESS: 0
BACK PAIN: 0
EYE PAIN: 0
DIARRHEA: 0
CONSTIPATION: 0
RHINORRHEA: 0
COUGH: 0
VOMITING: 0
ABDOMINAL PAIN: 0
BLOOD IN STOOL: 0
NAUSEA: 0
